# Patient Record
Sex: FEMALE | Race: ASIAN | NOT HISPANIC OR LATINO | Employment: UNEMPLOYED | URBAN - METROPOLITAN AREA
[De-identification: names, ages, dates, MRNs, and addresses within clinical notes are randomized per-mention and may not be internally consistent; named-entity substitution may affect disease eponyms.]

---
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
## 2023-01-16 ENCOUNTER — APPOINTMENT (OUTPATIENT)
Dept: FAMILY MEDICINE | Age: 18
End: 2023-01-16

## 2023-11-04 ENCOUNTER — OFFICE VISIT (OUTPATIENT)
Dept: INTERNAL MEDICINE | Age: 18
End: 2023-11-04

## 2023-11-04 VITALS
OXYGEN SATURATION: 95 % | HEART RATE: 126 BPM | RESPIRATION RATE: 20 BRPM | TEMPERATURE: 99.5 F | SYSTOLIC BLOOD PRESSURE: 111 MMHG | DIASTOLIC BLOOD PRESSURE: 76 MMHG

## 2023-11-04 DIAGNOSIS — E86.0 MODERATE DEHYDRATION: ICD-10-CM

## 2023-11-04 DIAGNOSIS — Z20.822 SUSPECTED COVID-19 VIRUS INFECTION: Primary | ICD-10-CM

## 2023-11-04 DIAGNOSIS — R50.9 FEVER WITH CHILLS: ICD-10-CM

## 2023-11-04 DIAGNOSIS — N39.0 ACUTE UTI: ICD-10-CM

## 2023-11-04 LAB
APPEARANCE, POC: NORMAL
BILIRUBIN, POC: NEGATIVE
COLOR, POC: YELLOW
GLUCOSE UR-MCNC: NEGATIVE MG/DL
KETONES, POC: NEGATIVE MG/DL
NITRITE, POC: NEGATIVE
OCCULT BLOOD, POC: NEGATIVE
PH UR: 6 [PH] (ref 5–7)
PROT UR-MCNC: NEGATIVE MG/DL
SP GR UR: 1.02 (ref 1–1.03)
UROBILINOGEN UR-MCNC: 0.2 MG/DL (ref 0–1)
WBC (LEUKOCYTE) ESTERASE, POC: NEGATIVE

## 2023-11-04 PROCEDURE — 81003 URINALYSIS AUTO W/O SCOPE: CPT | Performed by: INTERNAL MEDICINE

## 2023-11-04 PROCEDURE — 99284 EMERGENCY DEPT VISIT MOD MDM: CPT

## 2023-11-04 PROCEDURE — 0241U SARS-COV-2/FLU A AND B/RSV BY PCR (GENEXPERT): CPT | Performed by: EMERGENCY MEDICINE

## 2023-11-04 PROCEDURE — 0241U SARS-COV-2/FLU A AND B/RSV BY PCR (GENEXPERT): CPT

## 2023-11-04 PROCEDURE — 99203 OFFICE O/P NEW LOW 30 MIN: CPT | Performed by: INTERNAL MEDICINE

## 2023-11-04 PROCEDURE — 0241U COVID/FLU/RSV PANEL: CPT | Performed by: CLINICAL MEDICAL LABORATORY

## 2023-11-04 RX ORDER — ALBUTEROL SULFATE 90 UG/1
1 AEROSOL, METERED RESPIRATORY (INHALATION) EVERY 4 HOURS PRN
Qty: 1 EACH | Refills: 1 | Status: SHIPPED | OUTPATIENT
Start: 2023-11-04

## 2023-11-04 RX ORDER — AZITHROMYCIN 500 MG/1
250 TABLET, FILM COATED ORAL DAILY
COMMUNITY

## 2023-11-04 RX ORDER — TRAMADOL HYDROCHLORIDE 50 MG/1
50 TABLET ORAL EVERY 6 HOURS PRN
COMMUNITY

## 2023-11-04 RX ORDER — ALBUTEROL SULFATE 90 UG/1
AEROSOL, METERED RESPIRATORY (INHALATION) EVERY 6 HOURS PRN
COMMUNITY

## 2023-11-04 RX ORDER — AZITHROMYCIN 250 MG/1
TABLET, FILM COATED ORAL
Qty: 6 TABLET | Refills: 0 | Status: SHIPPED | OUTPATIENT
Start: 2023-11-04 | End: 2023-11-08

## 2023-11-04 ASSESSMENT — ENCOUNTER SYMPTOMS
ALLERGIC/IMMUNOLOGIC NEGATIVE: 1
CHILLS: 1
HEMATOLOGIC/LYMPHATIC NEGATIVE: 1
WEAKNESS: 1
PSYCHIATRIC NEGATIVE: 1
ENDOCRINE NEGATIVE: 1
FEVER: 1
FATIGUE: 1
GASTROINTESTINAL NEGATIVE: 1
EYES NEGATIVE: 1
COUGH: 1

## 2023-11-04 ASSESSMENT — PATIENT HEALTH QUESTIONNAIRE - PHQ9
SUM OF ALL RESPONSES TO PHQ9 QUESTIONS 1 AND 2: 0
SUM OF ALL RESPONSES TO PHQ9 QUESTIONS 1 AND 2: 0
1. LITTLE INTEREST OR PLEASURE IN DOING THINGS: NOT AT ALL
2. FEELING DOWN, DEPRESSED OR HOPELESS: NOT AT ALL
CLINICAL INTERPRETATION OF PHQ2 SCORE: NO FURTHER SCREENING NEEDED

## 2023-11-04 ASSESSMENT — PAIN SCALES - GENERAL: PAINLEVEL: 0

## 2023-11-05 ENCOUNTER — APPOINTMENT (OUTPATIENT)
Dept: GENERAL RADIOLOGY | Facility: HOSPITAL | Age: 18
End: 2023-11-05
Attending: EMERGENCY MEDICINE
Payer: COMMERCIAL

## 2023-11-05 ENCOUNTER — HOSPITAL ENCOUNTER (EMERGENCY)
Facility: HOSPITAL | Age: 18
Discharge: HOME | End: 2023-11-05
Attending: EMERGENCY MEDICINE
Payer: COMMERCIAL

## 2023-11-05 VITALS
TEMPERATURE: 98 F | OXYGEN SATURATION: 98 % | DIASTOLIC BLOOD PRESSURE: 67 MMHG | BODY MASS INDEX: 20 KG/M2 | HEART RATE: 110 BPM | WEIGHT: 110 LBS | SYSTOLIC BLOOD PRESSURE: 105 MMHG | RESPIRATION RATE: 16 BRPM

## 2023-11-05 DIAGNOSIS — J11.1 INFLUENZA: Primary | ICD-10-CM

## 2023-11-05 DIAGNOSIS — J18.9 COMMUNITY ACQUIRED PNEUMONIA OF LEFT LUNG, UNSPECIFIED PART OF LUNG: ICD-10-CM

## 2023-11-05 LAB
FLUAV + FLUBV RNA SPEC NAA+PROBE: NEGATIVE
FLUAV + FLUBV RNA SPEC NAA+PROBE: NEGATIVE
FLUAV RNA RESP QL NAA+PROBE: DETECTED
FLUBV RNA RESP QL NAA+PROBE: NOT DETECTED
RSV AG NPH QL IA.RAPID: NOT DETECTED
RSV RNA SPEC NAA+PROBE: NEGATIVE
SARS-COV-2 RNA RESP QL NAA+PROBE: NOT DETECTED
SARS-COV-2 RNA RESP QL NAA+PROBE: NOT DETECTED
SERVICE CMNT-IMP: ABNORMAL
SERVICE CMNT-IMP: ABNORMAL

## 2023-11-05 PROCEDURE — 71045 X-RAY EXAM CHEST 1 VIEW: CPT | Performed by: EMERGENCY MEDICINE

## 2023-11-05 PROCEDURE — 94640 AIRWAY INHALATION TREATMENT: CPT

## 2023-11-05 RX ORDER — OSELTAMIVIR PHOSPHATE 75 MG/1
75 CAPSULE ORAL 2 TIMES DAILY
Qty: 10 CAPSULE | Refills: 0 | Status: SHIPPED | OUTPATIENT
Start: 2023-11-05 | End: 2023-11-10

## 2023-11-05 RX ORDER — IPRATROPIUM BROMIDE AND ALBUTEROL SULFATE 2.5; .5 MG/3ML; MG/3ML
3 SOLUTION RESPIRATORY (INHALATION) ONCE
Status: COMPLETED | OUTPATIENT
Start: 2023-11-05 | End: 2023-11-05

## 2023-11-05 NOTE — ED INITIAL ASSESSMENT (HPI)
18YF c/c of fever Pt state that she been having fever and feeling of sob for the last two days Pt saw her pcp today and started on abx Pt state that she here now with increased feeling of sob

## 2023-11-05 NOTE — DISCHARGE INSTRUCTIONS
Use the albuterol inhaler up to 2 puffs every 4-6 hours as needed. Drink plenty of fluids. Alternate Tylenol and ibuprofen for fever and pain control. Use the azithromycin as previously prescribed. As well as the Tamiflu. Return if develop worrisome symptoms or otherwise follow-up with your primary care physician as an outpatient.

## 2024-03-28 ENCOUNTER — TELEPHONE (OUTPATIENT)
Dept: INTERNAL MEDICINE | Age: 19
End: 2024-03-28

## 2024-12-23 ENCOUNTER — OFFICE VISIT (OUTPATIENT)
Facility: CLINIC | Age: 19
End: 2024-12-23
Payer: COMMERCIAL

## 2024-12-23 VITALS
HEART RATE: 90 BPM | WEIGHT: 115.81 LBS | HEIGHT: 62.5 IN | DIASTOLIC BLOOD PRESSURE: 72 MMHG | BODY MASS INDEX: 20.78 KG/M2 | SYSTOLIC BLOOD PRESSURE: 104 MMHG

## 2024-12-23 DIAGNOSIS — N92.6 IRREGULAR BLEEDING: ICD-10-CM

## 2024-12-23 DIAGNOSIS — Z12.4 PAPANICOLAOU SMEAR FOR CERVICAL CANCER SCREENING: Primary | ICD-10-CM

## 2024-12-23 PROCEDURE — 87591 N.GONORRHOEAE DNA AMP PROB: CPT | Performed by: OBSTETRICS & GYNECOLOGY

## 2024-12-23 PROCEDURE — 87491 CHLMYD TRACH DNA AMP PROBE: CPT | Performed by: OBSTETRICS & GYNECOLOGY

## 2024-12-23 RX ORDER — NITROFURANTOIN 25; 75 MG/1; MG/1
100 CAPSULE ORAL 2 TIMES DAILY
Qty: 14 CAPSULE | Refills: 0 | Status: SHIPPED | OUTPATIENT
Start: 2024-12-23 | End: 2024-12-30

## 2024-12-23 NOTE — PROGRESS NOTES
Vernell Goins is a 19 year old female  No LMP recorded. (Menstrual status: IUD - Intrauterine Device).   Chief Complaint   Patient presents with    Gyn Problem     Mirena inserted 22   Ongoing spotting for about a month and half. (Light)   .     She has had the IUD for 2 years.  She was spotting lightly for a month and a half.  She has interstitial cystitis and has pain when she has vaginal bleeding.  She does not desire pregnancy at this time she is using condoms.  No vaginal itching or burning.  6 months ago she had some spotting and did an ultrasound that showed the IUD was in place    OBSTETRICS HISTORY:  OB History    Para Term  AB Living   0 0 0 0 0 0   SAB IAB Ectopic Multiple Live Births   0 0 0 0 0       GYNE HISTORY:  Periods irregular light    History   Sexual Activity    Sexual activity: Yes    Partners: Male    Birth control/ protection: Condom, I.U.D., Mirena                 MEDICAL HISTORY:  Past Medical History:    Bladder pain    Urgency-frequency syndrome. 10/17/22 Urogyn Consult Dr. Akiko Min - urine sent for culture. Bladder instillation done. Discussed dietary & behavioral modification, Uribel, Prelief     Childhood asthma (HCC)    Bad in childhood. ED visit a few times around age 1. Then had nebulizer for home. Many times steroids - last time prior to 12 years of age. Symptoms improved with age.     Cyst of Bartholin's gland    had one in the past per patient    Dyspareunia, female    Occasionally painful. Can be both superficial & deep    Dysuria    Chronic issues with burning, urgency, frequency of urination, both during periods & in between periods with negative urine culture. Referred to urology. Saw urogynecologist. Suspected interstitial cystitis    History of pelvic ultrasound    Unremarkable pelvic US (transabdominal only)     History of sebaceous cyst    Inflamed sebaceous cyst with each period - different areas in vulvar/groin area. Has taken  antibiotics for this. Has also had one removed from the right posterior labia minora.     Migraine with aura    visual changes    Mood disturbance    Patient reports mom does not know patient has been diagnosed with depression or anxiety. Doesn't want mom to know.     UTI (urinary tract infection)    >100k Staphylococcus saprophyticus. Rx Augmentin 875 BID x 7 days     Verruca plantaris    viral warts right great toe. Treated by dermatologist.        SURGICAL HISTORY:  Past Surgical History:   Procedure Laterality Date    Hc bladder irrigation simple lavage or instillation  10/17/2022    Bladder instillation - heparin, normal saline, lidocaine - Dr. Akiko Min, Urogynecologist    Other Right 10/06/2022    Removal inflamed sebaceous cyst right posterior medial labia minora. Urogynecologist. Dr. Berenice Gottlieb       SOCIAL HISTORY:  Social History     Socioeconomic History    Marital status: Single     Spouse name: Not on file    Number of children: Not on file    Years of education: Not on file    Highest education level: Not on file   Occupational History    Not on file   Tobacco Use    Smoking status: Never    Smokeless tobacco: Never   Vaping Use    Vaping status: Never Used   Substance and Sexual Activity    Alcohol use: Yes     Comment: occ    Drug use: Never    Sexual activity: Yes     Partners: Male     Birth control/protection: Condom, I.U.D., Mirena   Other Topics Concern    Not on file   Social History Narrative    Not on file     Social Drivers of Health     Financial Resource Strain: Not on file   Food Insecurity: Not on file   Transportation Needs: Not on file   Physical Activity: Not on file   Stress: Not on file   Social Connections: Not on file   Housing Stability: Low Risk  (8/30/2022)    [facility]    Housing Stability     Mortgage Payment Concerns?: Not on file     Number of Places Lived in the Last Year: Not on file     Unstable  Housing?: Not on file       FAMILY HISTORY:  Family History   Problem Relation Age of Onset    Asthma Mother     Heart Disease Maternal Grandfather     Lipids Maternal Grandfather     Diabetes Paternal Grandfather     No Known Problems Brother     Endometriosis Neg     Breast Cancer Neg     Ovarian Cancer Neg     Colon Cancer Neg        MEDICATIONS:    Current Outpatient Medications:     azithromycin 500 MG Oral Tab, Take 0.5 tablets (250 mg total) by mouth daily., Disp: , Rfl:     albuterol 108 (90 Base) MCG/ACT Inhalation Aero Soln, Inhale into the lungs every 6 (six) hours as needed for Wheezing., Disp: , Rfl:     traMADol 50 MG Oral Tab, Take 1 tablet (50 mg total) by mouth every 6 (six) hours as needed for Pain., Disp: , Rfl:     levoFLOXacin 500 MG Oral Tab, , Disp: , Rfl:     nortriptyline 10 MG Oral Cap, , Disp: , Rfl:     clotrimazole-betamethasone 1-0.05 % External Cream, 1 thin application twice daily for 2-4 weeks. (Patient not taking: Reported on 11/4/2022), Disp: 45 g, Rfl: 0    ALBUTEROL IN, Inhale into the lungs., Disp: , Rfl:     PULMOMATE COMP/NEB DISPOSABLE, DAILY (Patient not taking: Reported on 12/23/2024), Disp: , Rfl:     ALLERGIES:  Allergies[1]      Review of Systems:  Constitutional:  Denies fatigue, night sweats, hot flashes    Genitourinary:  denies  incontinence, abnormal vaginal discharge, vaginal itching+ dtsuria    Neurological:  denies headaches, extremity weakness or numbness.      PHYSICAL EXAM:   Constitutional: well developed, well nourished  Head/Face: normocephalic  Skin/Hair: no unusual rashes or bruises   Extremities: no edema, no cyanosis  Psychiatric:  Oriented to time, place, person and situation. Appropriate mood and affect    Pelvic Exam:  External Genitalia: normal appearance, hair distribution, and no lesions  Urethral Meatus:  normal in size, location, without lesions and prolapse  Bladder:  No fullness, masses or tenderness  Vagina:  Normal appearance without  lesions, no abnormal discharge  Cervix:  Normal without tenderness on motion without lesions Pap.  Gen-Probe.  Uterus: normal in size, contour, position, mobility, without tenderness  Adnexa: normal without masses or tenderness  Perineum: normal  Anus: no hemorroids     Assessment & Plan:  There are no diagnoses linked to this encounter.    Irregular spotting with Mirena IUD    Pelvic ultrasound           [1] No Known Allergies

## 2024-12-24 LAB
C TRACH DNA SPEC QL NAA+PROBE: NEGATIVE
N GONORRHOEA DNA SPEC QL NAA+PROBE: NEGATIVE

## 2025-01-06 LAB
.: NORMAL
.: NORMAL